# Patient Record
Sex: MALE | Race: AMERICAN INDIAN OR ALASKA NATIVE
[De-identification: names, ages, dates, MRNs, and addresses within clinical notes are randomized per-mention and may not be internally consistent; named-entity substitution may affect disease eponyms.]

---

## 2018-11-17 ENCOUNTER — HOSPITAL ENCOUNTER (EMERGENCY)
Dept: HOSPITAL 42 - ED | Age: 43
Discharge: HOME | End: 2018-11-17
Payer: MEDICAID

## 2018-11-17 VITALS — BODY MASS INDEX: 28.7 KG/M2

## 2018-11-17 VITALS
OXYGEN SATURATION: 98 % | RESPIRATION RATE: 18 BRPM | HEART RATE: 86 BPM | SYSTOLIC BLOOD PRESSURE: 134 MMHG | DIASTOLIC BLOOD PRESSURE: 83 MMHG | TEMPERATURE: 97.9 F

## 2018-11-17 DIAGNOSIS — M54.5: Primary | ICD-10-CM

## 2018-11-17 DIAGNOSIS — B35.3: ICD-10-CM

## 2018-11-17 NOTE — RAD
Date of service: 11/17/2018



PROCEDURE:  Radiographs of the Lumbar Spine.



HISTORY:

r/o fx



COMPARISON:

No prior.



FINDINGS:



BONES:

Alignment appears satisfactory. No listhesis. No acute displaced 

fracture identified.



DISC SPACES:

Unremarkable.



OTHER FINDINGS:

None.



IMPRESSION:

No acute displaced fracture or subluxation identified.

## 2018-11-17 NOTE — RAD
PROCEDURE:  Right foot Radiographs.



HISTORY:

 r/o fx 



COMPARISON:

None available.



FINDINGS:



BONES:

No acute displaced fracture. 



JOINTS:

No dislocation. 



SOFT TISSUES:

Unremarkable. No evidence of radiopaque foreign body. 



OTHER FINDINGS:

None.



IMPRESSION:

No acute displaced fracture, dislocation, or significant joint 

effusion identified.



If symptoms persist, or if there is continued clinical concern, x-ray 

follow-up in 7-10 days should be considered.
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
